# Patient Record
Sex: FEMALE | Race: WHITE | NOT HISPANIC OR LATINO
[De-identification: names, ages, dates, MRNs, and addresses within clinical notes are randomized per-mention and may not be internally consistent; named-entity substitution may affect disease eponyms.]

---

## 2019-07-09 PROBLEM — Z00.00 ENCOUNTER FOR PREVENTIVE HEALTH EXAMINATION: Status: ACTIVE | Noted: 2019-07-09

## 2019-07-12 ENCOUNTER — RECORD ABSTRACTING (OUTPATIENT)
Age: 79
End: 2019-07-12

## 2019-07-12 ENCOUNTER — APPOINTMENT (OUTPATIENT)
Dept: GASTROENTEROLOGY | Facility: CLINIC | Age: 79
End: 2019-07-12
Payer: MEDICARE

## 2019-07-12 VITALS
WEIGHT: 100 LBS | DIASTOLIC BLOOD PRESSURE: 72 MMHG | HEIGHT: 61 IN | BODY MASS INDEX: 18.88 KG/M2 | HEART RATE: 74 BPM | SYSTOLIC BLOOD PRESSURE: 165 MMHG

## 2019-07-12 DIAGNOSIS — Z80.1 FAMILY HISTORY OF MALIGNANT NEOPLASM OF TRACHEA, BRONCHUS AND LUNG: ICD-10-CM

## 2019-07-12 DIAGNOSIS — Z84.89 FAMILY HISTORY OF OTHER SPECIFIED CONDITIONS: ICD-10-CM

## 2019-07-12 DIAGNOSIS — E78.00 PURE HYPERCHOLESTEROLEMIA, UNSPECIFIED: ICD-10-CM

## 2019-07-12 DIAGNOSIS — D50.9 IRON DEFICIENCY ANEMIA, UNSPECIFIED: ICD-10-CM

## 2019-07-12 DIAGNOSIS — Z86.39 PERSONAL HISTORY OF OTHER ENDOCRINE, NUTRITIONAL AND METABOLIC DISEASE: ICD-10-CM

## 2019-07-12 PROCEDURE — 99214 OFFICE O/P EST MOD 30 MIN: CPT

## 2019-07-12 RX ORDER — CALCIUM CITRATE/VITAMIN D3 315MG-6.25
TABLET ORAL
Refills: 0 | Status: ACTIVE | COMMUNITY

## 2019-07-12 RX ORDER — LEVOTHYROXINE SODIUM 25 UG/1
25 TABLET ORAL
Refills: 0 | Status: DISCONTINUED | COMMUNITY
End: 2019-07-12

## 2019-07-12 RX ORDER — CHLORHEXIDINE GLUCONATE 4 %
600 LIQUID (ML) TOPICAL
Refills: 0 | Status: ACTIVE | COMMUNITY

## 2019-07-12 NOTE — PHYSICAL EXAM
[Abdomen Tenderness] : non-tender [Abdomen Soft] : soft [] : no hepato-splenomegaly [Normal Sphincter Tone] : normal sphincter tone [Abdomen Mass (___ Cm)] : no abdominal mass palpated [Abdomen Hernia] : no hernia was discovered [No Rectal Mass] : no rectal mass [Internal Hemorrhoid] : internal hemorrhoids [External Hemorrhoid] : external hemorrhoids [Occult Blood Positive] : stool was negative for occult blood [FreeTextEntry1] : stool is soft heme negative. Sphincter tone is normal.Large mixed hemorrhoids with internal and external component

## 2019-07-12 NOTE — ASSESSMENT
[FreeTextEntry1] : 1.  acute diverticulitis, resolved\par patient was diagnosed in er with 'colitis', but the clinical picture to me was more that of acute diverticulitis, not acute colitis\par judging by the decision in the ed to administer an enema, onstipation  considered to be a contributing factor\par #2. I suspect that the patient responded to Augmentin, has gotten better, although it took some time, and that the constipation resulting from Percocet may have played a contributing role.\par Dr. satish sam has already stopped the Percocet.\par No right. Now I suspect that the patient will do wellis feeling better,and he is completing her 10 days of Augmentin\par I do believe thatshe would be best off by taking a probiotic such as [facility] store for 10 days to help restore her colonic dalia towards normal, 250 mg two times per day\par 3.  she has only been pain free for three days, and for that reason, i am suggesting two more days of augmentin..on the one hand, i don’t like to continue antibiotics longer than necessary, but on the other hand, i don’t want us to short change the antibiotics, and risk a relapse of the diverticulits\par \par More than 50% of the face to face time was devoted to counseling and /or coordination of care.  THis coordination of care may have included reviewing other medical notes and reports, and communicating with other health professionals\par \par \par the PROBIOTIC, OTC, IS FLORASTOR, 250 MG TWICE A DAY FOR TWO TO FOUR WEEKS, I PREFER [facility]

## 2019-07-12 NOTE — HISTORY OF PRESENT ILLNESS
[FreeTextEntry1] : cc: abdominal pain\par \par has been having abdominal pain since approx July 1, llq primarily, persistent, severe, no fever, no real change in her bowel movements,\par the pain was originally rather intense, \par and seen in [facility], cat scan\par was performed, and was reported as 'colitis' with inflammation in or about the desending colon.\par \par was begun on percocet, one four times per day, for five days.\par \par and she got constipated.\par \par also, she had previously seen dr ponce, who had prescribed augmentin, po bid, presumably 875 mg dose, completed today.\par \par around the time of the visit to dr Andino, which was just wed july10th, there was improvement, but not before, and she had already been on augment for one week.\par \par the discomfort was constant, not relieved with a bm, but for almost a week, there was no bowel movement\par off the percocet, she finally had a bowel movement\par \par in the er, fleet enema, no response.\par

## 2019-08-23 ENCOUNTER — APPOINTMENT (OUTPATIENT)
Dept: GASTROENTEROLOGY | Facility: CLINIC | Age: 79
End: 2019-08-23

## 2019-08-30 ENCOUNTER — APPOINTMENT (OUTPATIENT)
Dept: GASTROENTEROLOGY | Facility: CLINIC | Age: 79
End: 2019-08-30
Payer: MEDICARE

## 2019-08-30 VITALS
SYSTOLIC BLOOD PRESSURE: 152 MMHG | HEART RATE: 58 BPM | DIASTOLIC BLOOD PRESSURE: 68 MMHG | BODY MASS INDEX: 18.88 KG/M2 | HEIGHT: 61 IN | WEIGHT: 100 LBS

## 2019-08-30 DIAGNOSIS — K57.92 DIVERTICULITIS OF INTESTINE, PART UNSPECIFIED, W/OUT PERFORATION OR ABSCESS W/OUT BLEEDING: ICD-10-CM

## 2019-08-30 DIAGNOSIS — K63.89 OTHER SPECIFIED DISEASES OF INTESTINE: ICD-10-CM

## 2019-08-30 PROCEDURE — 99215 OFFICE O/P EST HI 40 MIN: CPT

## 2019-08-30 RX ORDER — AMOXICILLIN AND CLAVULANATE POTASSIUM 875; 125 MG/1; MG/1
875-125 TABLET, COATED ORAL
Qty: 20 | Refills: 1 | Status: DISCONTINUED | COMMUNITY
Start: 2019-07-12 | End: 2019-08-30

## 2019-08-30 NOTE — HISTORY OF PRESENT ILLNESS
[FreeTextEntry1] : 1.  acute diverticulitis...\par \par has been the working diagnosis.\par patient has esperienced continuous and never ceasing llq discomfort, and pain, sometimes aggrav by certain positions, not waking her, but worse in supine pisition\par some gas, but not really relieved with an evacuation of stool\par \par patient has been without fever, appetite ok\par \par had augmentin prob for three weeks\par \par alwas sx localized to llq

## 2019-08-30 NOTE — PHYSICAL EXAM
[Abdomen Soft] : soft [Abdomen Tenderness] : non-tender [] : no hepato-splenomegaly [Abdomen Mass (___ Cm)] : no abdominal mass palpated [Abdomen Hernia] : no hernia was discovered [Normal Sphincter Tone] : normal sphincter tone [No Rectal Mass] : no rectal mass [Internal Hemorrhoid] : no internal hemorrhoids [External Hemorrhoid] : no external hemorrhoids [Occult Blood Positive] : stool was negative for occult blood

## 2019-08-30 NOTE — ASSESSMENT
[FreeTextEntry1] : 1.  llq pain has been persistent\par 2.  symptoms do not particularly suggest constipation\par 3.  there is some gaseousness, worse since the treatment\par 4.  perhaps the patient has developed SIBO, or small intestinal bacterial overgrowth, since the treatemnt for diverticulitis\par 5.  I do not find any compelling reason to say this is a persistent deiverticulitis\par \par 6.  EVENTUALLY WE NEED TO DO ANOTHER COLONOSCOPY, ESPEC IN NEXT TWO OR THREE MOS, BECAUSE OF RECURR OF RIGHT SIDED COLONIC ADENOMA\par 7.  but I don’t think colonoscopy will shed much ligh on the current symptoms\par 8. I am ordering flagyl, 250 mg tid for possible SIBO...no alcohol while taking the Flagyl...fourteen day trial, to see if that helops..might wait until after holiday weekend, as she may wish to have a glass of wine this holliday\par 9. have spoken to dr Andino..about care..and it may be she needs eval of left hip and or lumbar area to check for non colonic causes\par 10.. suggest a work up of left ovary, adnexa, ovarian and or gyn pathology beginning with a gyn eval\par \par More than 50% of the face to face time was devoted to counseling and /or coordination of care.  THis coordination of care may have included reviewing other medical notes and reports, and communicating with other health professionals\par

## 2020-12-31 PROBLEM — K63.89 SMALL INTESTINAL BACTERIAL OVERGROWTH: Status: ACTIVE | Noted: 2019-08-30

## 2021-08-12 ENCOUNTER — APPOINTMENT (OUTPATIENT)
Dept: GASTROENTEROLOGY | Facility: CLINIC | Age: 81
End: 2021-08-12
Payer: MEDICARE

## 2021-08-12 ENCOUNTER — NON-APPOINTMENT (OUTPATIENT)
Age: 81
End: 2021-08-12

## 2021-08-12 VITALS
SYSTOLIC BLOOD PRESSURE: 146 MMHG | TEMPERATURE: 97.3 F | WEIGHT: 102 LBS | HEIGHT: 61 IN | DIASTOLIC BLOOD PRESSURE: 68 MMHG | BODY MASS INDEX: 19.26 KG/M2 | HEART RATE: 60 BPM

## 2021-08-12 DIAGNOSIS — Z72.89 OTHER PROBLEMS RELATED TO LIFESTYLE: ICD-10-CM

## 2021-08-12 DIAGNOSIS — Z87.891 PERSONAL HISTORY OF NICOTINE DEPENDENCE: ICD-10-CM

## 2021-08-12 DIAGNOSIS — Z87.19 PERSONAL HISTORY OF OTHER DISEASES OF THE DIGESTIVE SYSTEM: ICD-10-CM

## 2021-08-12 DIAGNOSIS — D36.9 BENIGN NEOPLASM, UNSPECIFIED SITE: ICD-10-CM

## 2021-08-12 PROCEDURE — 99213 OFFICE O/P EST LOW 20 MIN: CPT

## 2021-08-12 RX ORDER — METRONIDAZOLE 250 MG/1
250 TABLET ORAL EVERY 6 HOURS
Qty: 56 | Refills: 2 | Status: COMPLETED | COMMUNITY
Start: 2019-08-30 | End: 2021-08-12

## 2021-08-12 RX ORDER — ALENDRONATE SODIUM 70 MG/1
70 TABLET ORAL
Refills: 0 | Status: ACTIVE | COMMUNITY

## 2021-08-12 RX ORDER — LEVOTHYROXINE SODIUM 0.03 MG/1
25 TABLET ORAL
Refills: 0 | Status: ACTIVE | COMMUNITY

## 2021-08-12 NOTE — ASSESSMENT
[FreeTextEntry1] : patient is here to discuss need for follow up colonoscopy\par \par i have to be clear to the patient and unequivocal that there is a need to do follow up colonoscopy, even though it may have gotten delated by the Pandemic\par \par therefore, i do advise that a fu exam be performed\par \par More than 50% of the face to face time was devoted to counseling and /or coordination of care.  THis coordination of care may have included reviewing other medical notes and reports, and communicating with other health professionals\par \par AS WE OBTAIN INFORMED CONSENT FOR COLONOSCOPY, UPPER ENDOSCOPY [EGD], OR BOTH PROCEDURES TOGETHER;\par \par As with all procedures, there are risks of which the patient should be aware\par \par 1.  Anesthesia; deep sedation with Propofol;  there is a small risk of aspiration and pulmonary infection.  The Anesthesiologist meets with the patient the morning of the procedure to discuss in more detail\par \par 2.  risk of bleeding; from removal of a polyp, or rarely, from biopsies, 1 % or less\par \par 3.  risk of injury or perforation of the colon or upper GI tract; one in a thousand or less,  from removing a polyp or from advancing the instrument\par \par \par patient is fortunately in excellent medical health without any serious co morbidities

## 2021-08-12 NOTE — HISTORY OF PRESENT ILLNESS
[FreeTextEntry1] : this is a healthy patient, now [age] years old\par who presents to discuss gi status, previous poss attacks of diverticulitis,\par and the fact she has had several adenomatous colon polyps, and hear my advice re need for further colonoscopy surveillance.\par \par patient had a colonoscopy on april 26th, 2018\par \par and a flat polyp was identified, corresponding to location of a previous large ascending colon polyp five years earlier\par \par and was removed with hot snare by piecemeal polypectomy\par \par it was one interhaustral fold above the cecum\par \par the aggregate size was 1.5 cm, and histology was tubular adenoma

## 2021-09-10 ENCOUNTER — RESULT REVIEW (OUTPATIENT)
Age: 81
End: 2021-09-10

## 2021-09-13 ENCOUNTER — APPOINTMENT (OUTPATIENT)
Dept: GASTROENTEROLOGY | Facility: HOSPITAL | Age: 81
End: 2021-09-13